# Patient Record
Sex: MALE | Race: WHITE | NOT HISPANIC OR LATINO | Employment: PART TIME | RURAL
[De-identification: names, ages, dates, MRNs, and addresses within clinical notes are randomized per-mention and may not be internally consistent; named-entity substitution may affect disease eponyms.]

---

## 2023-05-25 ENCOUNTER — OFFICE VISIT (OUTPATIENT)
Dept: FAMILY MEDICINE CLINIC | Facility: CLINIC | Age: 28
End: 2023-05-25
Payer: MEDICAID

## 2023-05-25 VITALS
SYSTOLIC BLOOD PRESSURE: 122 MMHG | HEIGHT: 70 IN | BODY MASS INDEX: 30.49 KG/M2 | DIASTOLIC BLOOD PRESSURE: 62 MMHG | HEART RATE: 65 BPM | RESPIRATION RATE: 16 BRPM | WEIGHT: 213 LBS | OXYGEN SATURATION: 96 %

## 2023-05-25 DIAGNOSIS — K59.09 CHRONIC CONSTIPATION: ICD-10-CM

## 2023-05-25 DIAGNOSIS — M79.89 BILATERAL HAND SWELLING: ICD-10-CM

## 2023-05-25 DIAGNOSIS — R20.0 ARM NUMBNESS: ICD-10-CM

## 2023-05-25 DIAGNOSIS — M54.42 CHRONIC BILATERAL LOW BACK PAIN WITH LEFT-SIDED SCIATICA: ICD-10-CM

## 2023-05-25 DIAGNOSIS — G89.29 CHRONIC BILATERAL LOW BACK PAIN WITH LEFT-SIDED SCIATICA: ICD-10-CM

## 2023-05-25 DIAGNOSIS — Z00.00 ANNUAL PHYSICAL EXAM: Primary | ICD-10-CM

## 2023-05-25 RX ORDER — DOCUSATE SODIUM 100 MG/1
100 CAPSULE, LIQUID FILLED ORAL DAILY
COMMUNITY
Start: 2023-04-27

## 2023-05-25 RX ORDER — BUPRENORPHINE AND NALOXONE 8; 2 MG/1; MG/1
FILM, SOLUBLE BUCCAL; SUBLINGUAL
COMMUNITY
Start: 2023-05-11

## 2023-05-25 RX ORDER — MULTIVIT,CALC,MINS/IRON/FOLIC 9MG-400MCG
5 TABLET ORAL NIGHTLY
COMMUNITY
Start: 2023-05-12

## 2023-05-25 RX ORDER — LANOLIN ALCOHOL/MO/W.PET/CERES
400 CREAM (GRAM) TOPICAL DAILY
COMMUNITY
Start: 2023-04-27
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
## 2023-05-26 LAB
ALBUMIN SERPL-MCNC: 4.6 G/DL (ref 4.1–5.2)
ALBUMIN/GLOB SERPL: 2 {RATIO} (ref 1.2–2.2)
ALP SERPL-CCNC: 105 IU/L (ref 44–121)
ALT SERPL-CCNC: 41 IU/L (ref 0–44)
AST SERPL-CCNC: 26 IU/L (ref 0–40)
BASOPHILS # BLD AUTO: 0 X10E3/UL (ref 0–0.2)
BASOPHILS NFR BLD AUTO: 0 %
BILIRUB SERPL-MCNC: 0.4 MG/DL (ref 0–1.2)
BUN SERPL-MCNC: 14 MG/DL (ref 6–20)
BUN/CREAT SERPL: 15 (ref 9–20)
CALCIUM SERPL-MCNC: 9.5 MG/DL (ref 8.7–10.2)
CHLORIDE SERPL-SCNC: 103 MMOL/L (ref 96–106)
CHOLEST SERPL-MCNC: 201 MG/DL (ref 100–199)
CO2 SERPL-SCNC: 26 MMOL/L (ref 20–29)
CREAT SERPL-MCNC: 0.96 MG/DL (ref 0.76–1.27)
EGFRCR SERPLBLD CKD-EPI 2021: 111 ML/MIN/1.73
EOSINOPHIL # BLD AUTO: 0.4 X10E3/UL (ref 0–0.4)
EOSINOPHIL NFR BLD AUTO: 4 %
ERYTHROCYTE [DISTWIDTH] IN BLOOD BY AUTOMATED COUNT: 13.3 % (ref 11.6–15.4)
GLOBULIN SER CALC-MCNC: 2.3 G/DL (ref 1.5–4.5)
GLUCOSE SERPL-MCNC: 97 MG/DL (ref 70–99)
HBA1C MFR BLD: 5.7 % (ref 4.8–5.6)
HCT VFR BLD AUTO: 42.2 % (ref 37.5–51)
HDLC SERPL-MCNC: 33 MG/DL
HGB BLD-MCNC: 14.5 G/DL (ref 13–17.7)
IMM GRANULOCYTES # BLD AUTO: 0 X10E3/UL (ref 0–0.1)
IMM GRANULOCYTES NFR BLD AUTO: 0 %
LDLC SERPL CALC-MCNC: 134 MG/DL (ref 0–99)
LYMPHOCYTES # BLD AUTO: 4.3 X10E3/UL (ref 0.7–3.1)
LYMPHOCYTES NFR BLD AUTO: 47 %
MCH RBC QN AUTO: 30 PG (ref 26.6–33)
MCHC RBC AUTO-ENTMCNC: 34.4 G/DL (ref 31.5–35.7)
MCV RBC AUTO: 87 FL (ref 79–97)
MONOCYTES # BLD AUTO: 0.5 X10E3/UL (ref 0.1–0.9)
MONOCYTES NFR BLD AUTO: 6 %
NEUTROPHILS # BLD AUTO: 4 X10E3/UL (ref 1.4–7)
NEUTROPHILS NFR BLD AUTO: 43 %
PLATELET # BLD AUTO: 298 X10E3/UL (ref 150–450)
POTASSIUM SERPL-SCNC: 4.6 MMOL/L (ref 3.5–5.2)
PROT SERPL-MCNC: 6.9 G/DL (ref 6–8.5)
RBC # BLD AUTO: 4.83 X10E6/UL (ref 4.14–5.8)
SODIUM SERPL-SCNC: 142 MMOL/L (ref 134–144)
TRIGL SERPL-MCNC: 188 MG/DL (ref 0–149)
TSH SERPL DL<=0.005 MIU/L-ACNC: 1.53 UIU/ML (ref 0.45–4.5)
VLDLC SERPL CALC-MCNC: 34 MG/DL (ref 5–40)
WBC # BLD AUTO: 9.2 X10E3/UL (ref 3.4–10.8)

## 2023-05-26 NOTE — PROGRESS NOTES
Please call patient and let him know that his blood count, thyroid function and electrolytes are normal. His A1c shows that he is prediabetic and his cholesterol is very high. He does not need medication at this point for either of them but he needs to make some changes in his diet or he will need to start medication. He needs to eat less fried and fast food. He needs to cur down on carbs including french fries, potatoes and bread.

## 2023-06-02 DIAGNOSIS — M54.42 CHRONIC BILATERAL LOW BACK PAIN WITH LEFT-SIDED SCIATICA: Primary | ICD-10-CM

## 2023-06-02 DIAGNOSIS — G89.29 CHRONIC BILATERAL LOW BACK PAIN WITH LEFT-SIDED SCIATICA: Primary | ICD-10-CM

## 2023-08-01 ENCOUNTER — OFFICE VISIT (OUTPATIENT)
Dept: GASTROENTEROLOGY | Facility: CLINIC | Age: 28
End: 2023-08-01
Payer: MEDICAID

## 2023-08-01 VITALS
HEART RATE: 57 BPM | HEIGHT: 70 IN | WEIGHT: 211.4 LBS | DIASTOLIC BLOOD PRESSURE: 76 MMHG | BODY MASS INDEX: 30.26 KG/M2 | SYSTOLIC BLOOD PRESSURE: 123 MMHG | TEMPERATURE: 97.8 F

## 2023-08-01 DIAGNOSIS — K59.04 CHRONIC IDIOPATHIC CONSTIPATION: Primary | ICD-10-CM

## 2023-08-01 PROCEDURE — 99203 OFFICE O/P NEW LOW 30 MIN: CPT | Performed by: NURSE PRACTITIONER

## 2023-08-01 PROCEDURE — 1160F RVW MEDS BY RX/DR IN RCRD: CPT | Performed by: NURSE PRACTITIONER

## 2023-08-01 PROCEDURE — 1159F MED LIST DOCD IN RCRD: CPT | Performed by: NURSE PRACTITIONER

## 2024-05-09 ENCOUNTER — OFFICE VISIT (OUTPATIENT)
Dept: FAMILY MEDICINE CLINIC | Facility: CLINIC | Age: 29
End: 2024-05-09
Payer: MEDICAID

## 2024-05-09 VITALS
BODY MASS INDEX: 30.35 KG/M2 | HEIGHT: 70 IN | WEIGHT: 212 LBS | SYSTOLIC BLOOD PRESSURE: 124 MMHG | OXYGEN SATURATION: 96 % | DIASTOLIC BLOOD PRESSURE: 86 MMHG | HEART RATE: 69 BPM

## 2024-05-09 DIAGNOSIS — S29.012A UPPER BACK STRAIN, INITIAL ENCOUNTER: Primary | ICD-10-CM

## 2024-05-09 PROCEDURE — 99213 OFFICE O/P EST LOW 20 MIN: CPT | Performed by: PHYSICIAN ASSISTANT

## 2024-05-09 RX ORDER — PREDNISONE 20 MG/1
TABLET ORAL
Qty: 6 TABLET | Refills: 0 | Status: SHIPPED | OUTPATIENT
Start: 2024-05-09

## 2024-07-31 ENCOUNTER — OFFICE VISIT (OUTPATIENT)
Dept: FAMILY MEDICINE CLINIC | Facility: CLINIC | Age: 29
End: 2024-07-31
Payer: MEDICAID

## 2024-07-31 VITALS
DIASTOLIC BLOOD PRESSURE: 82 MMHG | HEIGHT: 71 IN | TEMPERATURE: 98 F | OXYGEN SATURATION: 97 % | BODY MASS INDEX: 29.82 KG/M2 | HEART RATE: 70 BPM | WEIGHT: 213 LBS | SYSTOLIC BLOOD PRESSURE: 132 MMHG

## 2024-07-31 DIAGNOSIS — L03.115 CELLULITIS OF RIGHT LOWER EXTREMITY: Primary | ICD-10-CM

## 2024-07-31 PROCEDURE — 1160F RVW MEDS BY RX/DR IN RCRD: CPT | Performed by: INTERNAL MEDICINE

## 2024-07-31 PROCEDURE — 1159F MED LIST DOCD IN RCRD: CPT | Performed by: INTERNAL MEDICINE

## 2024-07-31 PROCEDURE — 99213 OFFICE O/P EST LOW 20 MIN: CPT | Performed by: INTERNAL MEDICINE

## 2024-07-31 RX ORDER — SULFAMETHOXAZOLE AND TRIMETHOPRIM 800; 160 MG/1; MG/1
1 TABLET ORAL 2 TIMES DAILY
Qty: 14 TABLET | Refills: 0 | Status: SHIPPED | OUTPATIENT
Start: 2024-07-31

## 2024-07-31 NOTE — PROGRESS NOTES
Office Note     Name: Martin Mcgovern    : 1995     MRN: [id number]     Chief Complaint  Leg Pain (Pt is here for a spot on his right leg onset 1 week. States over the past 3 days it has gotten red and hot to the touch. )    Subjective     History of Present Illness:  Martin Mcgovern is a 28 y.o. male who presents today for:    The areas initially look like an insect bite or bug bite but has become more red and irritated.  Counter medicines have not helped.  No fever.      Review of Systems:   Review of Systems    Past Medical History:   Past Medical History:   Diagnosis Date    ADHD     Bipolar 1 disorder        Past Surgical History:   Past Surgical History:   Procedure Laterality Date    MANDIBLE FRACTURE SURGERY      TONSILLECTOMY         Family History:   Family History   Problem Relation Age of Onset    Colon cancer Neg Hx        Social History:   Social History     Socioeconomic History    Marital status: Single   Tobacco Use    Smoking status: Every Day     Current packs/day: 1.00     Average packs/day: 1 pack/day for 4.6 years (4.6 ttl pk-yrs)     Types: Cigarettes     Start date: 2020     Passive exposure: Current    Smokeless tobacco: Current     Types: Chew   Vaping Use    Vaping status: Some Days    Substances: Nicotine    Devices: Refillable tank   Substance and Sexual Activity    Alcohol use: Not Currently    Drug use: Never    Sexual activity: Yes     Partners: Female       Immunizations:   Immunization History   Administered Date(s) Administered    DTaP 5 2020        Medications:     Current Outpatient Medications:     buprenorphine-naloxone (SUBOXONE) 8-2 MG film film, Place 2 films under the tongue Daily., Disp: , Rfl:     Diclofenac Sodium (VOLTAREN) 1 % gel gel, Apply 4 g topically to the appropriate area as directed 4 (Four) Times a Day As Needed (joint pain)., Disp: 150 g, Rfl: 3    docusate sodium (COLACE) 100 MG capsule, Take 1 capsule by mouth Daily., Disp: , Rfl:      "Magnesium Oxide 400 (240 Mg) MG tablet, Take 1 tablet by mouth Daily., Disp: , Rfl:     mupirocin (BACTROBAN) 2 % ointment, Apply 1 Application topically to the appropriate area as directed 3 (Three) Times a Day., Disp: 22 g, Rfl: 1    sulfamethoxazole-trimethoprim (Bactrim DS) 800-160 MG per tablet, Take 1 tablet by mouth 2 (Two) Times a Day., Disp: 14 tablet, Rfl: 0    Allergies:   No Known Allergies    Objective     Vital Signs  /82   Pulse 70   Temp 98 °F (36.7 °C) (Oral)   Ht 180.3 cm (71\")   Wt 96.6 kg (213 lb)   SpO2 97%   BMI 29.71 kg/m²   Estimated body mass index is 29.71 kg/m² as calculated from the following:    Height as of this encounter: 180.3 cm (71\").    Weight as of this encounter: 96.6 kg (213 lb).            Physical Exam  Vitals and nursing note reviewed.   Constitutional:       Appearance: Normal appearance.   Cardiovascular:      Rate and Rhythm: Normal rate and regular rhythm.      Heart sounds: No murmur heard.     No friction rub. No gallop.   Pulmonary:      Effort: Pulmonary effort is normal.      Breath sounds: Normal breath sounds. No wheezing, rhonchi or rales.   Skin:     Findings: Lesion (3 cm area of erythema indurnation but no fluctuance) present.   Neurological:      Mental Status: He is alert.            Procedures     Assessment and Plan     Diagnoses:    ICD-10-CM ICD-9-CM   1. Cellulitis of right lower extremity  L03.115 682.6       Plan:  1. Cellulitis of right lower extremity  This is most likely cellulitis versus possible developing abscess however there is no overt pocket of fluid to drain today therefore I will put him on Bactrim to him MRSA.  Patient was advised if symptoms get worse particularly if the area enlarges or becomes more tender or painful or fever develops he should seek immediate care.  - sulfamethoxazole-trimethoprim (Bactrim DS) 800-160 MG per tablet; Take 1 tablet by mouth 2 (Two) Times a Day.  Dispense: 14 tablet; Refill: 0  - mupirocin " (BACTROBAN) 2 % ointment; Apply 1 Application topically to the appropriate area as directed 3 (Three) Times a Day.  Dispense: 22 g; Refill: 1       Follow Up  Return if symptoms worsen or fail to improve.    Patient was advised to call the office or seek medical care if  any issues discussed during this visit worsen or persist or if new concerns arise        MD WESLY [facility]  [address] [place] [id number] [phone]

## 2025-01-08 ENCOUNTER — TELEPHONE (OUTPATIENT)
Dept: FAMILY MEDICINE CLINIC | Facility: CLINIC | Age: 30
End: 2025-01-08
Payer: MEDICAID

## 2025-01-08 NOTE — TELEPHONE ENCOUNTER
Pt states that he is has been having numbness and pain in hands and feet intermittently for the past few years.  Pt states that he spoke with the Dr. He sees at the suboxone clinic and was told to call his pcp.      Pt scheduled appt to be seen. I advised patient if his symptoms worsen before appt he should be seen at ER for evaluation.    Pt voiced understanding and agreed to go.

## 2025-01-09 ENCOUNTER — OFFICE VISIT (OUTPATIENT)
Dept: FAMILY MEDICINE CLINIC | Facility: CLINIC | Age: 30
End: 2025-01-09
Payer: MEDICAID

## 2025-01-09 VITALS
DIASTOLIC BLOOD PRESSURE: 78 MMHG | HEIGHT: 71 IN | SYSTOLIC BLOOD PRESSURE: 120 MMHG | BODY MASS INDEX: 30.73 KG/M2 | WEIGHT: 219.5 LBS | HEART RATE: 82 BPM | OXYGEN SATURATION: 95 %

## 2025-01-09 DIAGNOSIS — R52 GENERALIZED BODY ACHES: ICD-10-CM

## 2025-01-09 DIAGNOSIS — J02.8 BACTERIAL PHARYNGITIS: Primary | ICD-10-CM

## 2025-01-09 DIAGNOSIS — R06.2 WHEEZING ON AUSCULTATION: ICD-10-CM

## 2025-01-09 DIAGNOSIS — G62.9 NEUROPATHY: ICD-10-CM

## 2025-01-09 DIAGNOSIS — R09.81 NASAL CONGESTION: ICD-10-CM

## 2025-01-09 DIAGNOSIS — R73.03 PREDIABETES: ICD-10-CM

## 2025-01-09 DIAGNOSIS — R20.0 NUMBNESS IN BOTH HANDS: ICD-10-CM

## 2025-01-09 DIAGNOSIS — J02.9 SORE THROAT: ICD-10-CM

## 2025-01-09 DIAGNOSIS — B96.89 BACTERIAL PHARYNGITIS: Primary | ICD-10-CM

## 2025-01-09 DIAGNOSIS — R05.9 COUGH IN ADULT: ICD-10-CM

## 2025-01-09 DIAGNOSIS — R20.0 NUMBNESS IN FEET: ICD-10-CM

## 2025-01-09 LAB
EXPIRATION DATE: NORMAL
EXPIRATION DATE: NORMAL
FLUAV AG UPPER RESP QL IA.RAPID: NOT DETECTED
FLUBV AG UPPER RESP QL IA.RAPID: NOT DETECTED
INTERNAL CONTROL: NORMAL
INTERNAL CONTROL: NORMAL
Lab: NORMAL
Lab: NORMAL
S PYO AG THROAT QL: NEGATIVE
SARS-COV-2 AG UPPER RESP QL IA.RAPID: NOT DETECTED

## 2025-01-09 PROCEDURE — 87428 SARSCOV & INF VIR A&B AG IA: CPT | Performed by: NURSE PRACTITIONER

## 2025-01-09 PROCEDURE — 87880 STREP A ASSAY W/OPTIC: CPT | Performed by: NURSE PRACTITIONER

## 2025-01-09 PROCEDURE — 1159F MED LIST DOCD IN RCRD: CPT | Performed by: NURSE PRACTITIONER

## 2025-01-09 PROCEDURE — 99214 OFFICE O/P EST MOD 30 MIN: CPT | Performed by: NURSE PRACTITIONER

## 2025-01-09 PROCEDURE — 1160F RVW MEDS BY RX/DR IN RCRD: CPT | Performed by: NURSE PRACTITIONER

## 2025-01-09 RX ORDER — BROMPHENIRAMINE MALEATE, PSEUDOEPHEDRINE HYDROCHLORIDE, AND DEXTROMETHORPHAN HYDROBROMIDE 2; 30; 10 MG/5ML; MG/5ML; MG/5ML
5-10 SYRUP ORAL 4 TIMES DAILY PRN
Qty: 300 ML | Refills: 1 | Status: SHIPPED | OUTPATIENT
Start: 2025-01-09

## 2025-01-09 RX ORDER — ALBUTEROL SULFATE 90 UG/1
2 INHALANT RESPIRATORY (INHALATION) EVERY 4 HOURS PRN
Qty: 18 G | Refills: 1 | Status: SHIPPED | OUTPATIENT
Start: 2025-01-09

## 2025-01-09 NOTE — PROGRESS NOTES
Office Note     Name: Martin Mcgovern    : 1995     MRN: [id number]     Chief Complaint  Sore Throat (Pt ise here for sore throat, aches, cough and congestion onset 2 days. ) and Numbness (Pt complains of numbness in hands and feet onset 2-3 years. )    Subjective     History of Present Illness:  Martin Mcgovern is a 29 y.o. male who presents today for illness.     History of Present Illness  The patient is a 29-year-old male who presents for evaluation of pharyngitis and neuropathy.    He has been experiencing symptoms of a sore throat, body aches, cough, and congestion for the past few days. His wife reported that he had a fever the previous night, although he does not own a thermometer to confirm this. He is a smoker but reports no shortness of breath or chest tightness beyond his usual baseline. He acknowledges wheezing and has used an inhaler in the past when ill, but it is not a regular part of his regimen. He also reports no ear discomfort. His cough is productive, yielding yellow mucus. He has been self-medicating with NyQuil, which has not provided significant relief.    He has been experiencing numbness in both his hands and feet for several years, with the condition being slightly more severe in his hands. This numbness is constant and has been previously evaluated at this clinic. He has also discussed this issue with his Suboxone physician, who recommended a consultation with his primary care physician. Despite undergoing laboratory tests, the cause of his symptoms remains undetermined. He occasionally struggles with tasks such as opening jars. He reports a lack of sensation in his toes when they come into contact with corners and does not feel pain when his hands are smashed or burned. However, he does report feeling pressure. His last laboratory tests were conducted in May 2023. He recalls being informed that his blood sugar levels were in the prediabetic range in , but he did not pursue  "further investigation. He is uncertain if his A1c levels have been checked at the Suboxone clinic.    SOCIAL HISTORY  He admits to smoking.    ALLERGIES  The patient has no known allergies.    MEDICATIONS  Current: NyQuil      Objective     Past Medical History:   Diagnosis Date    ADHD     Bipolar 1 disorder      Past Surgical History:   Procedure Laterality Date    MANDIBLE FRACTURE SURGERY      TONSILLECTOMY       Family History   Problem Relation Age of Onset    Colon cancer Neg Hx        Vital Signs  /78   Pulse 82   Ht 180.3 cm (71\")   Wt 99.6 kg (219 lb 8 oz)   SpO2 95%   BMI 30.61 kg/m²   Estimated body mass index is 30.61 kg/m² as calculated from the following:    Height as of this encounter: 180.3 cm (71\").    Weight as of this encounter: 99.6 kg (219 lb 8 oz).    Physical Exam  Vitals reviewed.   Constitutional:       Appearance: Normal appearance.   HENT:      Head: Normocephalic.      Right Ear: Tympanic membrane, ear canal and external ear normal.      Left Ear: Tympanic membrane, ear canal and external ear normal.      Nose: Congestion present.      Mouth/Throat:      Mouth: Mucous membranes are moist.      Pharynx: Posterior oropharyngeal erythema present.   Cardiovascular:      Rate and Rhythm: Normal rate and regular rhythm.      Heart sounds: Normal heart sounds. No murmur heard.  Pulmonary:      Breath sounds: No stridor. Examination of the right-upper field reveals wheezing. Examination of the left-upper field reveals wheezing. Examination of the right-middle field reveals wheezing. Examination of the left-middle field reveals wheezing. Examination of the right-lower field reveals wheezing. Examination of the left-lower field reveals wheezing. Wheezing (inspiratory and expiratory) present. No rhonchi or rales.   Skin:     General: Skin is warm and dry.      Capillary Refill: Capillary refill takes less than 2 seconds.   Neurological:      General: No focal deficit present.      Mental " Status: He is alert and oriented to person, place, and time.   Psychiatric:         Mood and Affect: Mood normal.         Behavior: Behavior normal.        Physical Exam  The patient's throat is erythematous.  Wheezing is heard in the lungs during both inhalation and exhalation.  The patient has good capillary refill in the hands. Normal  strength is observed on both sides.    Results  Laboratory Studies  Strep swab was negative.      POCT Results (if applicable):  Results for orders placed or performed in visit on 01/09/25   POC Rapid Strep A    Collection Time: 01/09/25  8:29 AM    Specimen: Swab   Result Value Ref Range    Rapid Strep A Screen Negative Negative, VALID, INVALID, Not Performed    Internal Control Passed Passed    Lot Number [id number]     Expiration Date 01/03/2027    POCT SARS-CoV-2 Antigen FRAN + Flu    Collection Time: 01/09/25  8:41 AM    Specimen: Swab   Result Value Ref Range    SARS Antigen Not Detected Not Detected, Presumptive Negative    Influenza A Antigen FRAN Not Detected Not Detected    Influenza B Antigen FRAN Not Detected Not Detected    Internal Control Passed Passed    Lot Number [id number]     Expiration Date 10/23/2025             Assessment and Plan     Diagnoses and all orders for this visit:    1. Bacterial pharyngitis (Primary)  -     amoxicillin-clavulanate (AUGMENTIN) 875-125 MG per tablet; Take 1 tablet by mouth 2 (Two) Times a Day.  Dispense: 20 tablet; Refill: 0    2. Sore throat  -     POC Rapid Strep A  -     Throat / Upper Respiratory Culture - Swab, Throat    3. Cough in adult  -     brompheniramine-pseudoephedrine-DM 30-2-10 MG/5ML syrup; Take 5-10 mL by mouth 4 (Four) Times a Day As Needed for Congestion or Cough.  Dispense: 300 mL; Refill: 1    4. Nasal congestion  -     POCT SARS-CoV-2 Antigen FRAN + Flu  -     brompheniramine-pseudoephedrine-DM 30-2-10 MG/5ML syrup; Take 5-10 mL by mouth 4 (Four) Times a Day As Needed for Congestion or Cough.  Dispense: 300 mL;  Refill: 1    5. Generalized body aches    6. Wheezing on auscultation  -     albuterol sulfate  (90 Base) MCG/ACT inhaler; Inhale 2 puffs Every 4 (Four) Hours As Needed for Wheezing.  Dispense: 18 g; Refill: 1    7. Numbness in both hands    8. Numbness in feet    9. Neuropathy  -     CBC & Differential  -     Comprehensive Metabolic Panel  -     Hemoglobin A1c    10. Prediabetes  -     CBC & Differential  -     Comprehensive Metabolic Panel  -     Hemoglobin A1c      Assessment & Plan  1. Pharyngitis.  The strep test returned negative results. The patient's symptoms suggest a bacterial infection rather than a viral one. A nasal swab will be conducted to rule out influenza. A throat culture will also be sent for further analysis. An albuterol inhaler will be prescribed to manage the wheezing. Additionally, a prescription for Augmentin 875 mg, to be taken twice daily for 10 days, will be provided. He is advised to take the medication with food to prevent gastrointestinal upset. In the event of diarrhea, he should take a probiotic. A prescription for a medication similar to DayQuil and NyQuil, but stronger, will be provided. He is advised against taking DayQuil or NyQuil while on this medication to avoid double dosing.    2. Neuropathy.  The patient's blood glucose levels were previously in the prediabetic range. Blood work will be ordered today to assess his blood glucose levels, hemoglobin A1c, blood counts, electrolytes, liver function, and kidney function. If the results indicate diabetes, medication will be initiated to manage his blood glucose levels and monitor if this alleviates the numbness in his hands and feet. If the results are normal, a referral to a neurologist will be considered.       Follow Up  Return if symptoms worsen or fail to improve.      Patient or patient representative verbalized consent for the use of Ambient Listening during the visit with  OLIVIA Mahmood for chart  documentation. 1/9/2025  08:26 EST    Ita Monzon APRN

## 2025-01-10 DIAGNOSIS — R20.0 NUMBNESS IN BOTH HANDS: ICD-10-CM

## 2025-01-10 DIAGNOSIS — G62.9 NEUROPATHY: Primary | ICD-10-CM

## 2025-01-10 DIAGNOSIS — R20.0 NUMBNESS IN FEET: ICD-10-CM

## 2025-01-10 LAB
ALBUMIN SERPL-MCNC: 4.6 G/DL (ref 4.3–5.2)
ALP SERPL-CCNC: 111 IU/L (ref 44–121)
ALT SERPL-CCNC: 21 IU/L (ref 0–44)
AST SERPL-CCNC: 20 IU/L (ref 0–40)
BASOPHILS # BLD AUTO: 0 X10E3/UL (ref 0–0.2)
BASOPHILS NFR BLD AUTO: 0 %
BILIRUB SERPL-MCNC: 0.6 MG/DL (ref 0–1.2)
BUN SERPL-MCNC: 17 MG/DL (ref 6–20)
BUN/CREAT SERPL: 13 (ref 9–20)
CALCIUM SERPL-MCNC: 9.1 MG/DL (ref 8.7–10.2)
CHLORIDE SERPL-SCNC: 101 MMOL/L (ref 96–106)
CO2 SERPL-SCNC: 22 MMOL/L (ref 20–29)
CREAT SERPL-MCNC: 1.34 MG/DL (ref 0.76–1.27)
EGFRCR SERPLBLD CKD-EPI 2021: 74 ML/MIN/1.73
EOSINOPHIL # BLD AUTO: 0.2 X10E3/UL (ref 0–0.4)
EOSINOPHIL NFR BLD AUTO: 2 %
ERYTHROCYTE [DISTWIDTH] IN BLOOD BY AUTOMATED COUNT: 12.6 % (ref 11.6–15.4)
GLOBULIN SER CALC-MCNC: 2.5 G/DL (ref 1.5–4.5)
GLUCOSE SERPL-MCNC: 114 MG/DL (ref 70–99)
HBA1C MFR BLD: 5.6 % (ref 4.8–5.6)
HCT VFR BLD AUTO: 44.7 % (ref 37.5–51)
HGB BLD-MCNC: 14.7 G/DL (ref 13–17.7)
IMM GRANULOCYTES # BLD AUTO: 0 X10E3/UL (ref 0–0.1)
IMM GRANULOCYTES NFR BLD AUTO: 0 %
LYMPHOCYTES # BLD AUTO: 3 X10E3/UL (ref 0.7–3.1)
LYMPHOCYTES NFR BLD AUTO: 20 %
MCH RBC QN AUTO: 29.1 PG (ref 26.6–33)
MCHC RBC AUTO-ENTMCNC: 32.9 G/DL (ref 31.5–35.7)
MCV RBC AUTO: 89 FL (ref 79–97)
MONOCYTES # BLD AUTO: 1.2 X10E3/UL (ref 0.1–0.9)
MONOCYTES NFR BLD AUTO: 8 %
NEUTROPHILS # BLD AUTO: 10.6 X10E3/UL (ref 1.4–7)
NEUTROPHILS NFR BLD AUTO: 70 %
PLATELET # BLD AUTO: 304 X10E3/UL (ref 150–450)
POTASSIUM SERPL-SCNC: 4.9 MMOL/L (ref 3.5–5.2)
PROT SERPL-MCNC: 7.1 G/DL (ref 6–8.5)
RBC # BLD AUTO: 5.05 X10E6/UL (ref 4.14–5.8)
SODIUM SERPL-SCNC: 137 MMOL/L (ref 134–144)
WBC # BLD AUTO: 15.1 X10E3/UL (ref 3.4–10.8)

## 2025-01-12 LAB
BACTERIA SPEC RESP CULT: NORMAL
BACTERIA SPEC RESP CULT: NORMAL

## 2025-01-13 ENCOUNTER — TELEPHONE (OUTPATIENT)
Dept: FAMILY MEDICINE CLINIC | Facility: CLINIC | Age: 30
End: 2025-01-13
Payer: MEDICAID

## 2025-01-13 NOTE — TELEPHONE ENCOUNTER
NEUROLOGY REFERRAL IS STILL PENDING REVIEW FROM FRIDAY. THEY WILL CONTACT PATIENT AND LET HIM KNOW OF APPOINTMENT.

## 2025-01-13 NOTE — TELEPHONE ENCOUNTER
Called 1x and left [facility]    HUB RELAY     Ita Monzon APRN P [facility]  Please let Mj know we got his lab results back.    His blood counts indicate a bacterial infection, which is consistent with him having a sinus infection on the day of his appointment. Hopefully, but the time he gets this message, he will already be feeling better!    His electrolytes and liver function results are normal; however, his kidney function has declined since his last check of labs in May 2023. His eGFR has significantly declined and his creatinine level is high - he needs to make sure that he's drinking, at minimum, 64oz of water per day, in addition to anything else he may drink throughout the day.    I encouraged him to schedule an annual physical with Christine before he left his appt with me, but it doesn't appear that he did this. I'm sure she will want to check labs with his physical to keep an eye on his kidney function and make sure his blood counts have normalized as well.    His HgA1c is at the top end of the normal range, no longer in the prediabetes range. So, his neuropathy is not related to diabetes. As discussed during his appointment, based on this result, I have placed a referral to neurology for further evaluation of his neuropathy.    Ita Monzon APRN P [facility]  Please let Mj know his throat culture came back negative. Hopefully he is feeling better since starting the antibiotics.

## 2025-01-13 NOTE — TELEPHONE ENCOUNTER
LM on  to call back.    HUB to relay.     ----- Message from Ita Monzon sent at 1/10/2025  6:45 PM EST -----  Please let Mj know we got his lab results back.     His blood counts indicate a bacterial infection, which is consistent with him having a sinus infection on the day of his appointment. Hopefully, but the time he gets this message, he will already be feeling better!     His electrolytes and liver function results are normal; however, his kidney function has declined since his last check of labs in May 2023. His eGFR has significantly declined and his creatinine level is high - he needs to make sure that he's drinking, at minimum, 64oz of water per day, in addition to anything else he may drink throughout the day.     I encouraged him to schedule an annual physical with Christine before he left his appt with me, but it doesn't appear that he did this. I'm sure she will want to check labs with his physical to keep an eye on his kidney function and make sure his blood counts have normalized as well.     His HgA1c is at the top end of the normal range, no longer in the prediabetes range. So, his neuropathy is not related to diabetes. As discussed during his appointment, based on this result, I have placed a referral to neurology for further evaluation of his neuropathy.

## 2025-01-13 NOTE — TELEPHONE ENCOUNTER
LM on  to call back.     HUB to relay.      ----- Message from Ita Monzon sent at 1/12/2025 11:11 AM EST -----  Please let Mj know his throat culture came back negative. Hopefully he is feeling better since starting the antibiotics.

## 2025-01-20 ENCOUNTER — TELEPHONE (OUTPATIENT)
Dept: NEUROLOGY | Facility: CLINIC | Age: 30
End: 2025-01-20

## 2025-01-20 ENCOUNTER — OFFICE VISIT (OUTPATIENT)
Dept: FAMILY MEDICINE CLINIC | Facility: CLINIC | Age: 30
End: 2025-01-20
Payer: MEDICAID

## 2025-01-20 VITALS
SYSTOLIC BLOOD PRESSURE: 118 MMHG | OXYGEN SATURATION: 97 % | BODY MASS INDEX: 30.61 KG/M2 | DIASTOLIC BLOOD PRESSURE: 78 MMHG | HEART RATE: 80 BPM | HEIGHT: 71 IN

## 2025-01-20 DIAGNOSIS — G62.9 NEUROPATHY: Primary | ICD-10-CM

## 2025-01-20 PROCEDURE — 99214 OFFICE O/P EST MOD 30 MIN: CPT | Performed by: PHYSICIAN ASSISTANT

## 2025-01-20 NOTE — TELEPHONE ENCOUNTER
Caller: YUMIKO LE/ JACINTO ZIEGLER    Relationship to patient:     Best call back number: [phone]    Chief complaint: NEUROPATHY    Type of visit: NEW PATIENT    Requested date: ASAP     If rescheduling, when is the original appointment: MAY     Additional notes:THE PCP CALLED AND STATED THE PT IS GETTING WORSE AND THEY WOULD LIKE HIM TO BE SEEN SOONER IF POSSIBLE.     PT IS ON WAIT LIST    PLEASE REVIEW  THANK YOU

## 2025-01-20 NOTE — PROGRESS NOTES
"  Chief Complaint  neuropathy pain (Neuropathy pain in bilateral hands and feet. )    Subjective          History of Present Illness  Martin Mcgovern is here today with his  History of Present Illness  The patient presents for evaluation of neuropathy.    He was previously evaluated by Ita, who conducted several laboratory tests and recommended a referral to neurology. However, due to the delay in scheduling an appointment with neurology, he was advised to seek temporary relief from his primary care physician. He has been experiencing a recurrence of tingling and numbness in his hand, which has escalated to the point of causing him to drop objects. He reports that these symptoms are particularly pronounced during cold weather or at night, manifesting as aching and pain. He has been using ibuprofen and Aleve for symptom management but finds them insufficient. He states that these symptoms subsided for many months and have recently reappeared.  He has been sober for nearly 8 years and is currently on Suboxone. His Suboxone doctor has informed him that gabapentin would not interfere with his Suboxone treatment and would not appear on a drug test, provided he has a prescription. However, she is unable to prescribe gabapentin herself. He has an appointment scheduled with neurology on 05/01/2025 at 10:00 AM but is seeking an earlier consultation.He states that if we are not able to give him the gabapentin he will need to go to the streets and find something to help his pain.     SOCIAL HISTORY  The patient has been sober for almost 8 years.    FAMILY HISTORY  The patient's father had similar symptoms but was not diagnosed before he passed away.    MEDICATIONS  Current: Suboxone, ibuprofen, Aleve    Objective   Vital Signs:   /78   Pulse 80   Ht 180.3 cm (71\")   SpO2 97%   BMI 30.61 kg/m²     Body mass index is 30.61 kg/m².         Review of Systems      Current Outpatient Medications:   •  albuterol sulfate HFA " 108 (90 Base) MCG/ACT inhaler, Inhale 2 puffs Every 4 (Four) Hours As Needed for Wheezing., Disp: 18 g, Rfl: 1  •  buprenorphine-naloxone (SUBOXONE) 8-2 MG film film, Place 2 films under the tongue Daily. [employer], Disp: , Rfl:   •  docusate sodium (COLACE) 100 MG capsule, Take 1 capsule by mouth Daily., Disp: , Rfl:   •  Magnesium Oxide 400 (240 Mg) MG tablet, Take 1 tablet by mouth Daily., Disp: , Rfl:   •  Melatonin 10 MG chewable tablet, Chew., Disp: , Rfl:   •  amoxicillin-clavulanate (AUGMENTIN) 875-125 MG per tablet, Take 1 tablet by mouth 2 (Two) Times a Day., Disp: 20 tablet, Rfl: 0  •  brompheniramine-pseudoephedrine-DM 30-2-10 MG/5ML syrup, Take 5-10 mL by mouth 4 (Four) Times a Day As Needed for Congestion or Cough., Disp: 300 mL, Rfl: 1    Allergies: Patient has no known allergies.    Physical Exam  Vitals and nursing note reviewed.   Constitutional:       General: He is not in acute distress.     Appearance: Normal appearance. He is not ill-appearing, toxic-appearing or diaphoretic.   HENT:      Head: Normocephalic and atraumatic.   Pulmonary:      Effort: Pulmonary effort is normal.   Neurological:      General: No focal deficit present.      Mental Status: He is alert.   Psychiatric:         Mood and Affect: Mood normal.         Behavior: Behavior normal.      Physical Exam      Result Review :          Results               Assessment and Plan    Diagnoses and all orders for this visit:    1. Neuropathy (Primary)      Assessment & Plan    The patient's symptoms of pain and achiness have worsened. He states that his Pain management doctor has told him it would be fine for him to take gabapentin with his suboxone but cannot prescribe it herself. We discussed gabapentin is not recommended due to its potential to cause relapse, which could jeopardize his sobriety. He has been advised to continue using over-the-counter ibuprofen gel for symptomatic relief. Efforts will be made to expedite his  neurology appointment, currently set for May, to address the loss of strength and other symptoms.      Follow Up   No follow-ups on file.  Patient was given instructions and counseling regarding his condition or for health maintenance advice. Please see specific information pulled into the AVS if appropriate.     Patient or patient representative verbalized consent for the use of Ambient Listening during the visit with  REYES Gutierrez for chart documentation. 1/20/2025  16:33 EST    REYES Gutierrez  01/20/2025

## 2025-04-11 ENCOUNTER — TELEPHONE (OUTPATIENT)
Dept: FAMILY MEDICINE CLINIC | Facility: CLINIC | Age: 30
End: 2025-04-11
Payer: MEDICAID

## 2025-04-11 NOTE — TELEPHONE ENCOUNTER
"  Caller: Martin Mcgovern \"Mj\"    Relationship: Self    Best call back number: [phone]    Who is your current provider: JACINTO ZIEGLER    Is your current provider offboarding? NO    Who would you like your new provider to be: DR TEAGUE    What are your reasons for transferring care: CURRENT PCP NOT ABLE TO MEET NEEDS             "

## 2025-04-11 NOTE — TELEPHONE ENCOUNTER
Spoke with patient. Advised Dr Washington is not taking new patients. Put in a request to switch to Dr Carrasco. PCR given to Amie.

## 2025-04-14 ENCOUNTER — OFFICE VISIT (OUTPATIENT)
Dept: FAMILY MEDICINE CLINIC | Facility: CLINIC | Age: 30
End: 2025-04-14
Payer: MEDICAID

## 2025-04-14 VITALS
HEART RATE: 88 BPM | OXYGEN SATURATION: 95 % | HEIGHT: 71 IN | WEIGHT: 208 LBS | DIASTOLIC BLOOD PRESSURE: 72 MMHG | SYSTOLIC BLOOD PRESSURE: 106 MMHG | BODY MASS INDEX: 29.12 KG/M2

## 2025-04-14 DIAGNOSIS — M54.31 SCIATICA OF RIGHT SIDE: Primary | ICD-10-CM

## 2025-04-14 RX ORDER — TRIAMCINOLONE ACETONIDE 40 MG/ML
40 INJECTION, SUSPENSION INTRA-ARTICULAR; INTRAMUSCULAR ONCE
Status: COMPLETED | OUTPATIENT
Start: 2025-04-14 | End: 2025-04-14

## 2025-04-14 RX ORDER — PREDNISONE 20 MG/1
TABLET ORAL
Qty: 9 TABLET | Refills: 0 | Status: SHIPPED | OUTPATIENT
Start: 2025-04-14

## 2025-04-14 RX ORDER — CYCLOBENZAPRINE HCL 5 MG
5 TABLET ORAL 3 TIMES DAILY PRN
Qty: 45 TABLET | Refills: 0 | Status: SHIPPED | OUTPATIENT
Start: 2025-04-14

## 2025-04-14 RX ADMIN — TRIAMCINOLONE ACETONIDE 40 MG: 40 INJECTION, SUSPENSION INTRA-ARTICULAR; INTRAMUSCULAR at 15:31

## 2025-04-14 NOTE — PROGRESS NOTES
".Chief Complaint  Back Pain (Lower right sided back pain that goes down into hip and buttocks. )    Subjective          History of Present Illness  Martin Mcgovern is here today with his  History of Present Illness  The patient presents for evaluation of sciatica.    He has been experiencing symptoms consistent with sciatica for the past 7 days, which have progressively worsened. The pain, described as sharp, achy, and stabbing, is localized to his right side and radiates down the posterior aspect of his leg. He reports difficulty in maintaining balance, necessitating assistance from his wife to rise from the toilet seat this morning. He also recounts a near-fall incident two nights ago while attempting to stand from the couch. Despite the discomfort, he is able to sleep at night. However, he experiences pain during sitting, standing, and walking. He also reports persistent numbness and tingling in his feet. He has a scheduled appointment with a neurologist on 05/01/2025 for numbness and tingling in his hands and feet. His current condition has rendered him unable to work. Over-the-counter analgesics such as ibuprofen, Tylenol, and Motrin have been ineffective in alleviating his pain.     MEDICATIONS  Current: ibuprofen, Tylenol, Motrin    Objective   Vital Signs:   /72   Pulse 88   Ht 180.3 cm (71\")   Wt 94.3 kg (208 lb)   SpO2 95%   BMI 29.01 kg/m²     Body mass index is 29.01 kg/m².         Review of Systems      Current Outpatient Medications:   •  buprenorphine-naloxone (SUBOXONE) 8-2 MG film film, Place 2 films under the tongue Daily. [employer], Disp: , Rfl:   •  docusate sodium (COLACE) 100 MG capsule, Take 1 capsule by mouth Daily., Disp: , Rfl:   •  Magnesium Oxide 400 (240 Mg) MG tablet, Take 1 tablet by mouth Daily., Disp: , Rfl:   •  Melatonin 10 MG chewable tablet, Chew., Disp: , Rfl:   •  cyclobenzaprine (FLEXERIL) 5 MG tablet, Take 1 tablet by mouth 3 (Three) Times a Day As Needed for " Muscle Spasms., Disp: 45 tablet, Rfl: 0  •  predniSONE (DELTASONE) 20 MG tablet, 2 tab po qd x 3 days then 1 tab po x 3 days, Disp: 9 tablet, Rfl: 0  No current facility-administered medications for this visit.    Allergies: Patient has no known allergies.    Physical Exam  Vitals and nursing note reviewed.   Constitutional:       General: He is not in acute distress.     Appearance: Normal appearance. He is not ill-appearing, toxic-appearing or diaphoretic.   HENT:      Head: Normocephalic and atraumatic.   Pulmonary:      Effort: Pulmonary effort is normal.   Neurological:      Mental Status: He is alert. Mental status is at baseline.   Psychiatric:         Mood and Affect: Mood normal.         Behavior: Behavior normal.      Physical Exam      Result Review :          Results               Assessment and Plan    Diagnoses and all orders for this visit:    1. Sciatica of right side (Primary)  -     triamcinolone acetonide (KENALOG-40) injection 40 mg      -     predniSONE (DELTASONE) 20 MG tablet; 2 tab po qd x 3 days then 1 tab po x 3 days  Dispense: 9 tablet; Refill: 0  -     cyclobenzaprine (FLEXERIL) 5 MG tablet; Take 1 tablet by mouth 3 (Three) Times a Day As Needed for Muscle Spasms.  Dispense: 45 tablet; Refill: 0      Assessment & Plan    He reports experiencing pain for the past 7 days, which has worsened and is now causing difficulty in sitting, standing, and walking. The pain is described as sharp, achy, and stabbing, primarily on the right side, extending down the back of the leg. He has a history of chronic back pain and has tried ibuprofen, Tylenol, and Motrin without relief. A prescription for prednisone and Flexeril has been provided to manage the inflammation and muscle spasms. He is advised to start Flexeril today and begin prednisone tomorrow. A prednisone injection will be administered today to expedite relief. The prescriptions will be sent to [facility].    PROCEDURE  A prednisone injection  was administered today.      Follow Up   No follow-ups on file.  Patient was given instructions and counseling regarding his condition or for health maintenance advice. Please see specific information pulled into the AVS if appropriate.     Patient or patient representative verbalized consent for the use of Ambient Listening during the visit with  REYES Gutierrez for chart documentation. 4/14/2025  16:42 EDT    REYES Gutierrez  04/14/2025

## 2025-04-18 NOTE — TELEPHONE ENCOUNTER
PROVIDER SWITCH FROM JACINTO TO DR OLIVA HAS BEEN DENIED. FORM GIVEN TO LIZETH TO CONTACT PATIENT AND SCAN    REASON: DR OLIVA FEELS HE NEEDS TO STAY WITH HIS HIS CURRENT PROVIDER, SHE WOULDN'T TREAT ANY DIFFERENTLY

## 2025-05-01 ENCOUNTER — OFFICE VISIT (OUTPATIENT)
Dept: NEUROLOGY | Facility: CLINIC | Age: 30
End: 2025-05-01
Payer: MEDICAID

## 2025-05-01 ENCOUNTER — LAB (OUTPATIENT)
Dept: LAB | Facility: HOSPITAL | Age: 30
End: 2025-05-01
Payer: MEDICAID

## 2025-05-01 VITALS
DIASTOLIC BLOOD PRESSURE: 78 MMHG | HEART RATE: 64 BPM | OXYGEN SATURATION: 96 % | HEIGHT: 71 IN | SYSTOLIC BLOOD PRESSURE: 118 MMHG | BODY MASS INDEX: 29.9 KG/M2 | WEIGHT: 213.6 LBS

## 2025-05-01 DIAGNOSIS — G62.9 POLYNEUROPATHY: Primary | ICD-10-CM

## 2025-05-01 DIAGNOSIS — G62.9 POLYNEUROPATHY: ICD-10-CM

## 2025-05-01 LAB
FOLATE SERPL-MCNC: 9.07 NG/ML (ref 4.78–24.2)
T4 FREE SERPL-MCNC: 1.54 NG/DL (ref 0.92–1.68)
TSH SERPL DL<=0.05 MIU/L-ACNC: 1.4 UIU/ML (ref 0.27–4.2)
VIT B12 BLD-MCNC: 573 PG/ML (ref 211–946)

## 2025-05-01 PROCEDURE — 84165 PROTEIN E-PHORESIS SERUM: CPT

## 2025-05-01 PROCEDURE — 82607 VITAMIN B-12: CPT

## 2025-05-01 PROCEDURE — 84439 ASSAY OF FREE THYROXINE: CPT

## 2025-05-01 PROCEDURE — 82746 ASSAY OF FOLIC ACID SERUM: CPT

## 2025-05-01 PROCEDURE — 1160F RVW MEDS BY RX/DR IN RCRD: CPT | Performed by: PSYCHIATRY & NEUROLOGY

## 2025-05-01 PROCEDURE — 99204 OFFICE O/P NEW MOD 45 MIN: CPT | Performed by: PSYCHIATRY & NEUROLOGY

## 2025-05-01 PROCEDURE — 36415 COLL VENOUS BLD VENIPUNCTURE: CPT

## 2025-05-01 PROCEDURE — 1159F MED LIST DOCD IN RCRD: CPT | Performed by: PSYCHIATRY & NEUROLOGY

## 2025-05-01 PROCEDURE — 84207 ASSAY OF VITAMIN B-6: CPT

## 2025-05-01 PROCEDURE — 84155 ASSAY OF PROTEIN SERUM: CPT

## 2025-05-01 PROCEDURE — 84443 ASSAY THYROID STIM HORMONE: CPT

## 2025-05-01 PROCEDURE — 86592 SYPHILIS TEST NON-TREP QUAL: CPT

## 2025-05-01 PROCEDURE — 86038 ANTINUCLEAR ANTIBODIES: CPT

## 2025-05-01 NOTE — PROGRESS NOTES
Subjective:    CC: Martin Mcgovern is seen today in consultation at the request of OLIVIA Mahmood for neuropathy    HPI:  29-year-old male with a history of ADHD, bipolar 1 disorder, chronic smoking, drug use currently on Suboxone presents with paresthesias in his hands and feet.  As per patient he started having numbness in his hands and feet about 10 years ago.  Over time the symptoms have progressed.  Does not complain of any significant weakness.  He is currently working with electrical fencing.  He had a CT head in 2021 that was unremarkable.  Last A1c was 5.6, LDL was 134.  He denies having any history of alcohol abuse or diabetes.  Does have a history of previous heroin use but quit 8 years ago.  Mother has diabetic neuropathy.  Of note-I reviewed his PCPs notes-    The following portions of the patient's history were reviewed today and updated as of 05/01/2025  : allergies, current medications, past family history, past medical history, past social history, past surgical history, and problem list  These document will be scanned to patient's chart.      Current Outpatient Medications:     buprenorphine-naloxone (SUBOXONE) 8-2 MG film film, Place 2 films under the tongue Daily. [employer], Disp: , Rfl:     docusate sodium (COLACE) 100 MG capsule, Take 1 capsule by mouth Daily., Disp: , Rfl:     Magnesium Oxide 400 (240 Mg) MG tablet, Take 1 tablet by mouth Daily., Disp: , Rfl:     Melatonin 10 MG chewable tablet, Chew., Disp: , Rfl:    Past Medical History:   Diagnosis Date    ADHD     Bipolar 1 disorder       Past Surgical History:   Procedure Laterality Date    MANDIBLE FRACTURE SURGERY      TONSILLECTOMY        Family History   Problem Relation Age of Onset    Colon cancer Neg Hx       Social History     Socioeconomic History    Marital status: Single   Tobacco Use    Smoking status: Every Day     Current packs/day: 1.00     Average packs/day: 1 pack/day for 5.3 years (5.3 ttl pk-yrs)     Types:  "Cigarettes     Start date: 1/1/2020     Passive exposure: Current    Smokeless tobacco: Current     Types: Chew   Vaping Use    Vaping status: Some Days    Substances: Nicotine    Devices: Refillable tank    Passive vaping exposure: Yes   Substance and Sexual Activity    Alcohol use: Not Currently    Drug use: Yes     Types: Heroin     Comment: 8 years clean    Sexual activity: Yes     Partners: Female     Review of Systems   Neurological:  Positive for numbness.   All other systems reviewed and are negative.    Objective:    /78   Pulse 64   Ht 180.3 cm (71\")   Wt 96.9 kg (213 lb 9.6 oz)   SpO2 96%   BMI 29.79 kg/m²     Neurology Exam:    General apperance: NAD.     Mental status: Alert, awake and oriented to time place and person.    Recent and Remote memory: Intact.    Attention span and Concentration: Normal.     Language and Speech: Intact- No dysarthria.    Fluency, Naming , Repitition and Comprehension:  Intact    Cranial Nerves:   CN II: Visual fields are full. Intact. Fundi - Normal, No papillederma, Pupils - CHERIE  CN III, IV and VI: Extraocular movements are intact. Normal saccades.   CN V: Facial sensation is intact.   CN VII: Muscles of facial expression reveal no asymmetry. Intact.   CN VIII: Hearing is intact. Whispered voice intact.   CN IX and X: Palate elevates symmetrically. Intact  CN XI: Shoulder shrug is intact.   CN XII: Tongue is midline without evidence of atrophy or fasciculation.     Ophthalmoscopic exam of optic disc-normal    Motor:  Right UE muscle strength 5/5. Normal tone.     Left UE muscle strength 5/5. Normal tone.      Right LE muscle strength5/5. Normal tone.     Left LE muscle strength 5/5. Normal tone.      Sensory: Reduced pinprick at bilateral upper extremities to the mid forearm level as well as reduced in lower extremities to midfoot level with reduced vibration and temperature sensation in both feet    DTRs: 2+ bilaterally in upper and lower " extremities.    Babinski: Negative bilaterally.    Co-ordination: Normal finger-to-nose, heel to shin B/L.    Rhomberg: Negative.    Gait: Normal.    Cardiovascular: Regular rate and rhythm without murmur, gallop or rub.    Assessment and Plan:  1. Polyneuropathy  Based on signs and symptoms patient does have evidence of peripheral neuropathy which could be due to his previous IV drug abuse  I will get an EMG/NCS and check a neuropathy panel  He could be started on low doses of gabapentin in the future although I would be reluctant given his history    - EMRE by IFA, Reflex 9-biomarkers profile; Future  - Protein Elec + Interp, Serum; Future  - RPR Qualitative with Reflex to Quant; Future  - TSH+Free T4; Future  - Vitamin B12 & Folate; Future  - Vitamin B6; Future  - EMG & Nerve Conduction Test; Future       Return in about 3 months (around 8/1/2025).     I spent over 40 minutes with the patient face to face out of which over 50% (30 minutes) was spent in management, instructions and education.     Kamilah Christian MD

## 2025-05-02 LAB
ALBUMIN SERPL ELPH-MCNC: 3.6 G/DL (ref 2.9–4.4)
ALBUMIN/GLOB SERPL: 1.2 {RATIO} (ref 0.7–1.7)
ALPHA1 GLOB SERPL ELPH-MCNC: 0.3 G/DL (ref 0–0.4)
ALPHA2 GLOB SERPL ELPH-MCNC: 0.7 G/DL (ref 0.4–1)
B-GLOBULIN SERPL ELPH-MCNC: 1.3 G/DL (ref 0.7–1.3)
GAMMA GLOB SERPL ELPH-MCNC: 0.8 G/DL (ref 0.4–1.8)
GLOBULIN SER CALC-MCNC: 3 G/DL (ref 2.2–3.9)
LABORATORY COMMENT REPORT: NORMAL
M PROTEIN SERPL ELPH-MCNC: NORMAL G/DL
PROT PATTERN SERPL ELPH-IMP: NORMAL
PROT SERPL-MCNC: 6.6 G/DL (ref 6–8.5)
RPR SER QL: NORMAL

## 2025-05-04 LAB
ANA SER QL IF: NEGATIVE
LABORATORY COMMENT REPORT: NORMAL

## 2025-05-05 LAB — PYRIDOXAL PHOS SERPL-MCNC: 44.4 UG/L (ref 3.4–65.2)

## 2025-05-08 ENCOUNTER — TELEPHONE (OUTPATIENT)
Dept: FAMILY MEDICINE CLINIC | Facility: CLINIC | Age: 30
End: 2025-05-08
Payer: MEDICAID

## 2025-05-08 ENCOUNTER — TELEPHONE (OUTPATIENT)
Dept: FAMILY MEDICINE CLINIC | Facility: CLINIC | Age: 30
End: 2025-05-08

## 2025-05-08 RX ORDER — CYCLOBENZAPRINE HCL 5 MG
5 TABLET ORAL 3 TIMES DAILY PRN
Qty: 45 TABLET | Refills: 0 | Status: SHIPPED | OUTPATIENT
Start: 2025-05-08 | End: 2025-05-23

## 2025-05-08 NOTE — TELEPHONE ENCOUNTER
Pt contacted, reports that he does not see spinal doctor but sees neurology and has discussed the spine with them. Reports that he goes for EMG with neurology on Monday. Pt states that he does not have time to do PT at this time due to having to take off work too much now with other appts. He states he just needed the cyclobenzaprine to get him through until his appt with neurology on Monday.

## 2025-05-08 NOTE — TELEPHONE ENCOUNTER
Please call the patient and let him know that I have gotten the records from the ED and dont need to see him as a follow up unless he would like to be seen. Since his sciatica has flared up twice in the span of two months Id like to get him in with PT as well > He made mention of seeing a spinal doctor- I do not see that in his chart. I do have that he is seeing a neurologist for his hand numbness. Please let me know. If he would like to be seen by a spinal doctor he would need to come in for some xrays of his back. Ill pout in a referral to PT as well as calling in more of his flexeril

## 2025-05-08 NOTE — TELEPHONE ENCOUNTER
Can we get records on the ED visit please? I would assume it was [facility]

## 2025-05-08 NOTE — TELEPHONE ENCOUNTER
"  Caller: Martin Mcgovern \"Mj\"    Relationship: Self    Best call back number: [phone]     What medication are you requesting: CYCLOBENZAPRINE    What are your current symptoms: BACK PAIN    How long have you been experiencing symptoms: 1 TO 2 YEARS    Have you had these symptoms before:    [x] Yes  [] No    Have you been treated for these symptoms before:   [x] Yes  [] No    If a prescription is needed, what is your preferred pharmacy and phone number: [id number] [place] [address] [address] [facility] [address] [facility] - [phone] [facility] [phone]      Additional notes: PT HAS AN UPCOMING APPT WITH A SPECIALIST AND NEEDS A NEW RX TO HELP WITH THE PAIN.    "

## 2025-05-08 NOTE — TELEPHONE ENCOUNTER
"  Caller: Martin Mcgovern \"Mj\"    Relationship: Self    Best call back number: [phone]     What is the best time to reach you: ANY    Who are you requesting to speak with (clinical staff, provider,  specific staff member): CLARISSA ZIEGLER    Do you know the name of the person who called: PT    What was the call regarding: PT HAS AN APPT WITH A SPECIALIST ON MONDAY 05-12-25. PT WANTS TO KNOW IF HE HAS TO HAVE A HOSPITAL FOLLOW UP ? HE WENT TO THE ER FOR HIS BACK ON 05-07-25.   "

## 2025-05-12 ENCOUNTER — HOSPITAL ENCOUNTER (OUTPATIENT)
Dept: NEUROLOGY | Facility: HOSPITAL | Age: 30
Discharge: HOME | End: 2025-05-12
Admitting: PSYCHIATRY & NEUROLOGY
Payer: MEDICAID

## 2025-05-12 DIAGNOSIS — G62.9 POLYNEUROPATHY: ICD-10-CM

## 2025-05-12 PROCEDURE — 95886 MUSC TEST DONE W/N TEST COMP: CPT

## 2025-05-12 PROCEDURE — 95912 NRV CNDJ TEST 11-12 STUDIES: CPT

## 2025-05-29 ENCOUNTER — PATIENT MESSAGE (OUTPATIENT)
Dept: NEUROLOGY | Facility: CLINIC | Age: 30
End: 2025-05-29
Payer: MEDICAID